# Patient Record
Sex: FEMALE
[De-identification: names, ages, dates, MRNs, and addresses within clinical notes are randomized per-mention and may not be internally consistent; named-entity substitution may affect disease eponyms.]

---

## 2022-03-09 PROBLEM — Z00.00 ENCOUNTER FOR PREVENTIVE HEALTH EXAMINATION: Status: ACTIVE | Noted: 2022-03-09

## 2022-03-22 ENCOUNTER — NON-APPOINTMENT (OUTPATIENT)
Age: 37
End: 2022-03-22

## 2022-03-24 ENCOUNTER — APPOINTMENT (OUTPATIENT)
Dept: COLORECTAL SURGERY | Facility: CLINIC | Age: 37
End: 2022-03-24
Payer: COMMERCIAL

## 2022-03-24 ENCOUNTER — NON-APPOINTMENT (OUTPATIENT)
Age: 37
End: 2022-03-24

## 2022-03-24 VITALS
WEIGHT: 155 LBS | HEART RATE: 55 BPM | DIASTOLIC BLOOD PRESSURE: 83 MMHG | HEIGHT: 64 IN | BODY MASS INDEX: 26.46 KG/M2 | SYSTOLIC BLOOD PRESSURE: 144 MMHG | TEMPERATURE: 98.4 F

## 2022-03-24 DIAGNOSIS — K60.2 ANAL FISSURE, UNSPECIFIED: ICD-10-CM

## 2022-03-24 PROCEDURE — 99203 OFFICE O/P NEW LOW 30 MIN: CPT | Mod: 25

## 2022-03-24 PROCEDURE — 46600 DIAGNOSTIC ANOSCOPY SPX: CPT

## 2022-03-24 NOTE — HISTORY OF PRESENT ILLNESS
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 Went to a GI unknown provider in [place], [place] where pt is from who dx anal fissure and given compound rx cream, unknown which brought no improvement. c/o anal spams, pain with BMs, BRB with each BM intermittently throughout early 20s.\par \par Pt reports a lot of visits to doctors throughout early 20s, given ointments including steroid creams that brought no relief. At age 21 pt was advised to get botox by her then GI Dr. Perry and was referred to specialist in [place] for botox injections. Was advised she had internal and external hemorrhoids and that they could not do anything for her. 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 \par \par Pt states she c/o intermittent sharp pain feels like paper cut  2 weeks ago w/ BM and prolonged soreness after BM, denies straining. c/o intermittent bleeding in toilet and TP, which lasted 3 days. \par c/o flare ups 1 every 3 months. States sometimes with BM but other times w/o obvious aggravating factors. \par Pt c/o external hemorrhoid constant for 15 years, reports has increased in size, unchanged with BMs. Reports itching at onset but as since resolved. \par Pt c/o residual stool to TP with wiping intermittently if stool is softer. \par \par pt reports exercise 6 x per week, heaviest weight used is 15 pounds, mostly cardio. \par Denies constipation, diarrhea, burning\par \par BH: 1-2 daily, no straining \par Adequate intake of dietary fiber and water\par Denies use of stool softeners or fiber supplements\par \par Never colonoscopy\par + maternal GF colorectal CA, dx early 60s\par Denies  ASA/NSAIDs in last 7 days\par Denies latex allergy\par \par \par

## 2022-03-24 NOTE — ASSESSMENT
[FreeTextEntry1] : Exam findings and diagnosis were discussed at length with patient. \par Recommendations including increased fiber intake, adequate daily hydration, stool softeners as needed, and sitz baths as needed and after bowel movements were discussed.\par Fiber goal 25-30g/day, consider psyllium supplement. Adequate oral hydration - goal 64oz water/day\par Medical management, such diltiazem cream TID, was discussed. Rx provided.\par Patient understands that surgical options do exist for chronic fissures refractory to medical management, however we discussed a trial of medical management first. If no improvement, we discussed consideration for botox injection as an alternative management option.\par Recommend follow up in 6 weeks or sooner as needed if symptoms persist or progress.\par All questions answered, patient expressed understanding and is agreeable to this plan.\par

## 2022-03-24 NOTE — PHYSICAL EXAM
[FreeTextEntry1] : Medical assistant present for duration of physical examination\par \par General no acute distress, alert and oriented\par Psych calm, pleasant demeanor, responding appropriately to questions\par Nonlabored breathing\par Ambulating without assistance\par Skin normal color and pigment, no visible lesions or rashes\par \par Anorectal Exam:\par Inspection no erythema, induration or fluctuance, no skin excoriation, posterior midline anal fissure with small associated tag/pile, anterior midline anal tag without visible fissure\par ROSAMARIA nontender, no masses palpated, no blood on gloved finger\par \par Procedure: Anoscopy\par \par Pre procedure Diagnosis: anal fissure\par Post procedure Diagnosis: anal fissure\par Anesthesia: none\par Estimated blood loss: none\par Specimen: none\par Complications: none\par \par Consent obtained. Anoscopy was performed by passing a lighted anoscope with lubricant jelly into the anal canal and the entire anal mucosal surface was inspected. Findings included posterior midline anal fissure, no inflammation of internal hemorrhoids\par \par Patient tolerated examination and procedure well.\par \par \par

## 2022-06-02 ENCOUNTER — APPOINTMENT (OUTPATIENT)
Dept: COLORECTAL SURGERY | Facility: CLINIC | Age: 37
End: 2022-06-02

## 2024-10-31 ENCOUNTER — APPOINTMENT (OUTPATIENT)
Dept: COLORECTAL SURGERY | Facility: CLINIC | Age: 39
End: 2024-10-31
Payer: COMMERCIAL

## 2024-10-31 VITALS
BODY MASS INDEX: 27.83 KG/M2 | SYSTOLIC BLOOD PRESSURE: 113 MMHG | HEART RATE: 52 BPM | DIASTOLIC BLOOD PRESSURE: 66 MMHG | TEMPERATURE: 98.7 F | HEIGHT: 64 IN | WEIGHT: 163 LBS

## 2024-10-31 DIAGNOSIS — K64.4 RESIDUAL HEMORRHOIDAL SKIN TAGS: ICD-10-CM

## 2024-10-31 PROCEDURE — 99214 OFFICE O/P EST MOD 30 MIN: CPT | Mod: 25

## 2024-10-31 PROCEDURE — 46600 DIAGNOSTIC ANOSCOPY SPX: CPT

## 2024-11-01 PROBLEM — K64.4 ANAL SKIN TAG: Status: ACTIVE | Noted: 2024-11-01

## 2024-11-01 PROBLEM — K64.4 EXTERNAL HEMORRHOID: Status: ACTIVE | Noted: 2024-11-01
